# Patient Record
Sex: MALE | Race: WHITE | NOT HISPANIC OR LATINO | Employment: OTHER | URBAN - METROPOLITAN AREA
[De-identification: names, ages, dates, MRNs, and addresses within clinical notes are randomized per-mention and may not be internally consistent; named-entity substitution may affect disease eponyms.]

---

## 2022-09-28 ENCOUNTER — HOSPITAL ENCOUNTER (EMERGENCY)
Facility: MEDICAL CENTER | Age: 37
End: 2022-09-28
Attending: EMERGENCY MEDICINE
Payer: COMMERCIAL

## 2022-09-28 VITALS
RESPIRATION RATE: 16 BRPM | WEIGHT: 246.47 LBS | BODY MASS INDEX: 33.38 KG/M2 | HEART RATE: 91 BPM | OXYGEN SATURATION: 95 % | DIASTOLIC BLOOD PRESSURE: 86 MMHG | SYSTOLIC BLOOD PRESSURE: 145 MMHG | HEIGHT: 72 IN | TEMPERATURE: 98.8 F

## 2022-09-28 DIAGNOSIS — F10.10 ALCOHOL ABUSE: ICD-10-CM

## 2022-09-28 DIAGNOSIS — F41.9 ANXIETY: ICD-10-CM

## 2022-09-28 DIAGNOSIS — R45.851 SUICIDAL IDEATION: ICD-10-CM

## 2022-09-28 LAB
AMPHET UR QL SCN: NEGATIVE
BARBITURATES UR QL SCN: NEGATIVE
BENZODIAZ UR QL SCN: NEGATIVE
BZE UR QL SCN: NEGATIVE
CANNABINOIDS UR QL SCN: POSITIVE
EKG IMPRESSION: NORMAL
METHADONE UR QL SCN: NEGATIVE
OPIATES UR QL SCN: NEGATIVE
OXYCODONE UR QL SCN: NEGATIVE
PCP UR QL SCN: NEGATIVE
POC BREATHALIZER: 0.12 PERCENT (ref 0–0.01)
PROPOXYPH UR QL SCN: NEGATIVE

## 2022-09-28 PROCEDURE — 700102 HCHG RX REV CODE 250 W/ 637 OVERRIDE(OP): Performed by: EMERGENCY MEDICINE

## 2022-09-28 PROCEDURE — A9270 NON-COVERED ITEM OR SERVICE: HCPCS | Performed by: EMERGENCY MEDICINE

## 2022-09-28 PROCEDURE — 93005 ELECTROCARDIOGRAM TRACING: CPT | Performed by: EMERGENCY MEDICINE

## 2022-09-28 PROCEDURE — 99285 EMERGENCY DEPT VISIT HI MDM: CPT | Performed by: PSYCHIATRY & NEUROLOGY

## 2022-09-28 PROCEDURE — 80307 DRUG TEST PRSMV CHEM ANLYZR: CPT

## 2022-09-28 PROCEDURE — 93005 ELECTROCARDIOGRAM TRACING: CPT

## 2022-09-28 PROCEDURE — 700102 HCHG RX REV CODE 250 W/ 637 OVERRIDE(OP): Performed by: PSYCHIATRY & NEUROLOGY

## 2022-09-28 PROCEDURE — A9270 NON-COVERED ITEM OR SERVICE: HCPCS | Performed by: PSYCHIATRY & NEUROLOGY

## 2022-09-28 PROCEDURE — 302970 POC BREATHALIZER

## 2022-09-28 PROCEDURE — 700111 HCHG RX REV CODE 636 W/ 250 OVERRIDE (IP): Performed by: EMERGENCY MEDICINE

## 2022-09-28 PROCEDURE — 99285 EMERGENCY DEPT VISIT HI MDM: CPT

## 2022-09-28 PROCEDURE — 90791 PSYCH DIAGNOSTIC EVALUATION: CPT

## 2022-09-28 RX ORDER — LORAZEPAM 2 MG/1
2 TABLET ORAL ONCE
Status: COMPLETED | OUTPATIENT
Start: 2022-09-28 | End: 2022-09-28

## 2022-09-28 RX ORDER — DULOXETIN HYDROCHLORIDE 20 MG/1
20 CAPSULE, DELAYED RELEASE ORAL 2 TIMES DAILY
Status: DISCONTINUED | OUTPATIENT
Start: 2022-09-28 | End: 2022-09-28 | Stop reason: HOSPADM

## 2022-09-28 RX ORDER — HYDROXYZINE HYDROCHLORIDE 25 MG/1
50 TABLET, FILM COATED ORAL 3 TIMES DAILY PRN
Status: DISCONTINUED | OUTPATIENT
Start: 2022-09-28 | End: 2022-09-28 | Stop reason: HOSPADM

## 2022-09-28 RX ORDER — LORAZEPAM 1 MG/1
1 TABLET ORAL ONCE
Status: COMPLETED | OUTPATIENT
Start: 2022-09-28 | End: 2022-09-28

## 2022-09-28 RX ORDER — ONDANSETRON 4 MG/1
4 TABLET, ORALLY DISINTEGRATING ORAL ONCE
Status: COMPLETED | OUTPATIENT
Start: 2022-09-28 | End: 2022-09-28

## 2022-09-28 RX ORDER — PROMETHAZINE HYDROCHLORIDE 25 MG/1
25 TABLET ORAL ONCE
Status: COMPLETED | OUTPATIENT
Start: 2022-09-28 | End: 2022-09-28

## 2022-09-28 RX ADMIN — LORAZEPAM 2 MG: 2 TABLET ORAL at 13:17

## 2022-09-28 RX ADMIN — PROMETHAZINE HYDROCHLORIDE 25 MG: 25 TABLET ORAL at 17:22

## 2022-09-28 RX ADMIN — LORAZEPAM 1 MG: 1 TABLET ORAL at 06:30

## 2022-09-28 RX ADMIN — ONDANSETRON 4 MG: 4 TABLET, ORALLY DISINTEGRATING ORAL at 06:42

## 2022-09-28 ASSESSMENT — ENCOUNTER SYMPTOMS
ABDOMINAL PAIN: 0
WEIGHT LOSS: 0
DIARRHEA: 0
RESPIRATORY NEGATIVE: 1
CARDIOVASCULAR NEGATIVE: 1
NAUSEA: 1
BACK PAIN: 1
VOMITING: 0
CHILLS: 0
NEUROLOGICAL NEGATIVE: 1
CONSTIPATION: 0
FEVER: 0

## 2022-09-28 NOTE — ED NOTES
Received report and assumed care of pt. Pt is sleeping in [facility]. Remains calm and cooperative. Sitter is outside of room watching pt.

## 2022-09-28 NOTE — ED NOTES
Patient's home medications have been reviewed by the pharmacy team.  Pt presents with SI and panic attacks and is pending psych facility placement.     Past Medical History:   Diagnosis Date    Bipolar depression (HCC)        Patient's Medications    No medications on file          A:  Medications do not appear to be contributing to current complaints.       P:    No recommendations at this time. No home medications.    Gallo Panchal, Pharmacy Intern

## 2022-09-28 NOTE — DISCHARGE PLANNING
Alert Team Note:    Contacted by [facility], spoke to Radha. Pt has been accepted. Accepting is Dr. Monahan. Facility expects transport at 1700.   Informed LH CHARMAINE Mcbride.

## 2022-09-28 NOTE — ED NOTES
Pt ambulatory to green 37 from triage, report from Debra. Pt changed into hospital clothing. All personal belongings and equipment removed from room. Security called for belongings check. Chart up for ERP.
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
## 2022-09-28 NOTE — DISCHARGE PLANNING
[facility]ERT TEAM DISCHARGE PLANNING NOTE    Date:  9/28/22  Patient Name:  Steven Ewing - 36 y.o. - Discharge Planning  MRN:  [id number]   YOB: 1985  ADMISSION DATE:  9/28/2022      Writer forwarded transfer packet for inpatient psychiatric care to the following community providers:  [facility], [facility]   Items  included in the transfer packet:   __x___Face Sheet   __x___Pages 1 and 2 of completed legal hold   __x___Alert Team/Psych Assessment   _____H&P   __x___UDS   __x___Blood Alcohol   __x___Vital signs   __n/a___Pregnancy Test (if applicable)   _____Medications List   _____Covid Screen

## 2022-09-28 NOTE — DISCHARGE PLANNING
Legal Hold Transfer     Referral: Legal hold transfer to Mental Health Facility     Intervention: Notified by  Ammon that pt has been accepted to [facility].      Pt's accepting physcian is Dr. Monahan     Transport arranged through Karlie at [facility]     The pt will be picked up at 1700      Notified Bedside RN Debra and Alert Team ANNIKA Hoffman of the departure time as well as accepting facility.      Transfer packet created with original legal hold and placed on chart.      Plan: Pt will be transferring to [facility] today at 1700 via [facility].

## 2022-09-28 NOTE — DISCHARGE PLANNING
Alert Team Note:    Per Velasquez with [facility], pt insurance was not able to be verified. Writer contacted RADHIKA Vidal for insurance verification. Per philly, insurance has been verified and she will scan a copy into the chart.

## 2022-09-29 NOTE — ED NOTES
Pt transferred by EMS   Transfer packet and pt belongings with EMS  Pt vss, nad, ambulatory steady

## 2022-09-29 NOTE — DISCHARGE SUMMARY
ED Observation Discharge Summary    Patient:Isela Martinez  Patient : 1985  Patient MRN: [id number]  Patient PCP: Pcp Pt States None    Admit Date: 2022  Discharge Date and Time: 22 5:17 PM  Discharge Diagnosis: Suicidal ideation  Discharge Attending: Dima Trejo D.O.  Discharge Service: ED Observation    ED Course  Isela is a 36 y.o. male who was evaluated at [facility] for suicidal ideation.  He is observed in the emergency department, medically cleared for transfer.  Is been accepted to [facility].  He is currently complaining of some nausea since being given Zofran ODT for treatment.  Otherwise he is still stable for transfer to [facility] for psychiatric evaluation and treatment.    Discharge Exam:  BP (!) 145/86   Pulse 91   Temp 37.1 °C (98.8 °F)   Resp 16   Ht 1.829 m (6')   Wt 112 kg (246 lb 7.6 oz)   SpO2 95%   BMI 33.43 kg/m² .    Constitutional: Awake and alert. Nontoxic  HENT:  Grossly normal  Eyes: Grossly normal  Neck: Normal range of motion  Cardiovascular: Normal heart rate   Thorax & Lungs: No respiratory distress  Abdomen: Nontender  Skin:  No pathologic rash.   Extremities: Well perfused  Psychiatric: Affect normal    Labs  Results for orders placed or performed during the hospital encounter of 22   Urine Drug Screen   Result Value Ref Range    Amphetamines Urine Negative Negative    Barbiturates Negative Negative    Benzodiazepines Negative Negative    Cocaine Metabolite Negative Negative    Methadone Negative Negative    Opiates Negative Negative    Oxycodone Negative Negative    Phencyclidine -Pcp Negative Negative    Propoxyphene Negative Negative    Cannabinoid Metab Positive (A) Negative   POC BREATHALIZER   Result Value Ref Range    POC Breathalizer 0.116 (A) 0.00 - 0.01 Percent   EKG   Result Value Ref Range    Report       [facility] Emergency Dept.    Test Date:  2022  Pt Name:    ISELA MARTINEZ                Department: ER  MRN:        [id number]                      Room:  Gender:     Male                         Technician: [id number]  :        1985                   Requested By:ER TRIAGE PROTOCOL  Order #:    [id number]                    Reading MD: BROCK DOUGHERTY MD    Measurements  Intervals                                Axis  Rate:       92                           P:          43  AR:         151                          QRS:        35  QRSD:       92                           T:          31  QT:         367  QTc:        455    Interpretive Statements  Sinus rhythm  Abnormal R-wave progression, early transition  No previous ECG available for comparison  Electronically Signed On 2022 6:28:18 PDT by BROCK DOUGHERTY MD         Radiology  No orders to display         Medications:   New Prescriptions    No medications on file       Transfer to [facility] in stable condition  Electronically signed by: Dima Trejo D.O., 2022 5:17 PM